# Patient Record
Sex: FEMALE | Race: WHITE
[De-identification: names, ages, dates, MRNs, and addresses within clinical notes are randomized per-mention and may not be internally consistent; named-entity substitution may affect disease eponyms.]

---

## 2019-01-28 ENCOUNTER — HOSPITAL ENCOUNTER (OUTPATIENT)
Dept: HOSPITAL 92 - BICRAD | Age: 29
Discharge: HOME | End: 2019-01-28
Attending: INTERNAL MEDICINE
Payer: COMMERCIAL

## 2019-01-28 DIAGNOSIS — M54.5: Primary | ICD-10-CM

## 2019-01-28 PROCEDURE — 72100 X-RAY EXAM L-S SPINE 2/3 VWS: CPT

## 2019-01-28 NOTE — RAD
LUMBAR SPINE 3 VIEWS:

 

HISTORY: 

Low back pain.

 

FINDINGS: 

There are 5 lumbar-type vertebrae.  Pedicles are intact.  Vertebral body height and alignment are luz marina
ntained.  No acute fracture or dislocation.

 

IMPRESSION: 

No acute osseous abnormalities are demonstrated.

 

POS: TODD